# Patient Record
Sex: FEMALE | URBAN - METROPOLITAN AREA
[De-identification: names, ages, dates, MRNs, and addresses within clinical notes are randomized per-mention and may not be internally consistent; named-entity substitution may affect disease eponyms.]

---

## 2022-05-24 ENCOUNTER — OFFICE VISIT (OUTPATIENT)
Dept: URBAN - METROPOLITAN AREA CLINIC 81 | Facility: CLINIC | Age: 62
End: 2022-05-24
Payer: OTHER GOVERNMENT

## 2022-05-24 DIAGNOSIS — H52.4 PRESBYOPIA: ICD-10-CM

## 2022-05-24 DIAGNOSIS — H53.19 OTHER SUBJECTIVE VISUAL DISTURBANCES: ICD-10-CM

## 2022-05-24 DIAGNOSIS — G43.809 OTHER MIGRAINE, NOT INTRACTABLE, WITHOUT STATUS MIGRAINOSUS: ICD-10-CM

## 2022-05-24 DIAGNOSIS — H25.13 AGE-RELATED NUCLEAR CATARACT, BILATERAL: Primary | ICD-10-CM

## 2022-05-24 PROCEDURE — 99203 OFFICE O/P NEW LOW 30 MIN: CPT | Performed by: OPTOMETRIST

## 2022-05-24 PROCEDURE — 92134 CPTRZ OPH DX IMG PST SGM RTA: CPT | Performed by: OPTOMETRIST

## 2022-05-24 ASSESSMENT — INTRAOCULAR PRESSURE
OD: 16
OS: 16

## 2022-05-24 ASSESSMENT — KERATOMETRY
OD: 45.25
OS: 44.88

## 2022-05-24 ASSESSMENT — VISUAL ACUITY
OS: 20/30
OD: 20/25

## 2022-05-24 NOTE — IMPRESSION/PLAN
Impression: Other subjective visual disturbances: H53.19 Bilateral.
-ocular migraines, visual disturbance for about 30 minutes, increasing in frequency
-order baseline OCT mac, reviewed today, flat/normal OD/OS
-baseline dilated exam reveals unremarkable retina findings in both eyes Plan: Discussed diagnosis with patient in detail. Reviewed signs and symptoms of ocular migraines. Advised to continue care with PCP for migraine management. Patient instructed to call if condition gets worse.

## 2022-05-24 NOTE — IMPRESSION/PLAN
Impression: Presbyopia: H52.4 Bilateral. Plan: Discussed condition in detail with patient. Dispensed Rx for glasses to patient and instructed on normal adaptation period.

## 2022-05-24 NOTE — IMPRESSION/PLAN
Impression: Other migraine, not intractable, without status migrainosus: G43.809 Bilateral. Plan: Discussed. Reviewed signs and symptoms of ocular migraines. Continue care with PCP for migraine management. Advised to call if visual changes occur.

## 2022-05-24 NOTE — IMPRESSION/PLAN
Impression: Age-related nuclear cataract, bilateral: H25.13 Bilateral.
-glare complaints are longstanding Plan: Observe for worsening. No treatment currently recommended as cataracts do not affect the patients day to day life. Patient to monitor for vision changes and if vision significantly worsens, advised to RTC for evaluation. Recommend updating glasses at this time.

## 2023-05-26 ENCOUNTER — OFFICE VISIT (OUTPATIENT)
Dept: URBAN - METROPOLITAN AREA CLINIC 76 | Facility: CLINIC | Age: 63
End: 2023-05-26
Payer: OTHER GOVERNMENT

## 2023-05-26 DIAGNOSIS — H25.13 AGE-RELATED NUCLEAR CATARACT, BILATERAL: Primary | ICD-10-CM

## 2023-05-26 DIAGNOSIS — H53.19 OTHER SUBJECTIVE VISUAL DISTURBANCES: ICD-10-CM

## 2023-05-26 DIAGNOSIS — H52.4 PRESBYOPIA: ICD-10-CM

## 2023-05-26 DIAGNOSIS — H35.54 DYSTROPHY PRIMARILY INVOLVING THE RETINAL PIGMENT EPITHELIUM: ICD-10-CM

## 2023-05-26 PROCEDURE — 92134 CPTRZ OPH DX IMG PST SGM RTA: CPT | Performed by: OPTOMETRIST

## 2023-05-26 PROCEDURE — 99214 OFFICE O/P EST MOD 30 MIN: CPT | Performed by: OPTOMETRIST

## 2023-05-26 ASSESSMENT — INTRAOCULAR PRESSURE
OD: 11
OS: 11

## 2023-05-26 ASSESSMENT — VISUAL ACUITY
OD: 20/30
OS: 20/30

## 2023-05-26 ASSESSMENT — KERATOMETRY
OD: 44.38
OS: 45.25

## 2023-05-26 NOTE — IMPRESSION/PLAN
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 -5/26/23 ordered and reviewed MAC OCT
-positive family history of ARMD Plan: Discussed diagnosis with patient in detail. No treatment is required at this time. Will continue to observe condition and/or symptoms. Patient instructed to call if vision changes occur.

## 2023-05-26 NOTE — IMPRESSION/PLAN
Impression: Presbyopia: H52.4. Plan: Unreliable refraction today, due to dilated. Recommend to RTC on a day she is not dilated to get an accurate MRX.

## 2023-05-26 NOTE — IMPRESSION/PLAN
Impression: Other subjective visual disturbances: H53.19.
-longstanding Kaleidoscopes visual disturbances, caused by screens and fluorescent lights
- Headache happen right away after visual disturbance
-Usually has to wear a baseball cap to help a bit with the lights 
-Watching television or using a computer will trigger the kaleidoscope Plan: Discussed condition with patient, ocular migraines. Continue to monitor. Continue care with PCP for headaches.

## 2023-06-05 ENCOUNTER — TESTING ONLY (OUTPATIENT)
Dept: URBAN - METROPOLITAN AREA CLINIC 76 | Facility: CLINIC | Age: 63
End: 2023-06-05
Payer: OTHER GOVERNMENT

## 2023-06-05 DIAGNOSIS — H52.4 PRESBYOPIA: Primary | ICD-10-CM

## 2023-06-05 ASSESSMENT — VISUAL ACUITY
OD: 20/25
OS: 20/30

## 2023-06-05 ASSESSMENT — KERATOMETRY
OD: 45.38
OS: 45.00

## 2024-10-25 ENCOUNTER — OFFICE VISIT (OUTPATIENT)
Dept: URBAN - METROPOLITAN AREA CLINIC 76 | Facility: CLINIC | Age: 64
End: 2024-10-25
Payer: OTHER GOVERNMENT

## 2024-10-25 DIAGNOSIS — H25.13 AGE-RELATED NUCLEAR CATARACT, BILATERAL: Primary | ICD-10-CM

## 2024-10-25 DIAGNOSIS — H52.4 PRESBYOPIA: ICD-10-CM

## 2024-10-25 DIAGNOSIS — H35.54 DYSTROPHIES PRIMARILY INVOLVING THE RETINAL PIGMENT EPITHELIUM: ICD-10-CM

## 2024-10-25 PROCEDURE — 92015 DETERMINE REFRACTIVE STATE: CPT | Performed by: OPTOMETRIST

## 2024-10-25 PROCEDURE — 92134 CPTRZ OPH DX IMG PST SGM RTA: CPT | Performed by: OPTOMETRIST

## 2024-10-25 PROCEDURE — 99213 OFFICE O/P EST LOW 20 MIN: CPT | Performed by: OPTOMETRIST

## 2024-10-25 ASSESSMENT — INTRAOCULAR PRESSURE
OS: 10
OD: 10

## 2024-10-25 ASSESSMENT — KERATOMETRY
OS: 45.50
OD: 45.75

## 2024-10-25 ASSESSMENT — VISUAL ACUITY
OS: 20/30
OD: 20/30